# Patient Record
Sex: FEMALE | Race: WHITE | NOT HISPANIC OR LATINO | Employment: FULL TIME | URBAN - METROPOLITAN AREA
[De-identification: names, ages, dates, MRNs, and addresses within clinical notes are randomized per-mention and may not be internally consistent; named-entity substitution may affect disease eponyms.]

---

## 2023-01-10 ENCOUNTER — OFFICE VISIT (OUTPATIENT)
Dept: NEUROLOGY | Facility: CLINIC | Age: 53
End: 2023-01-10
Payer: OTHER GOVERNMENT

## 2023-01-10 VITALS
HEIGHT: 65 IN | BODY MASS INDEX: 31.65 KG/M2 | DIASTOLIC BLOOD PRESSURE: 84 MMHG | SYSTOLIC BLOOD PRESSURE: 122 MMHG | WEIGHT: 190 LBS

## 2023-01-10 DIAGNOSIS — G47.33 OSA (OBSTRUCTIVE SLEEP APNEA): Primary | ICD-10-CM

## 2023-01-10 DIAGNOSIS — I10 HYPERTENSION, UNSPECIFIED TYPE: ICD-10-CM

## 2023-01-10 DIAGNOSIS — R51.9 NONINTRACTABLE HEADACHE, UNSPECIFIED CHRONICITY PATTERN, UNSPECIFIED HEADACHE TYPE: ICD-10-CM

## 2023-01-10 DIAGNOSIS — R06.83 LOUD SNORING: ICD-10-CM

## 2023-01-10 DIAGNOSIS — G47.19 EXCESSIVE DAYTIME SLEEPINESS: ICD-10-CM

## 2023-01-10 DIAGNOSIS — R06.81 WITNESSED APNEIC SPELLS: ICD-10-CM

## 2023-01-10 PROCEDURE — 99204 OFFICE O/P NEW MOD 45 MIN: CPT | Mod: S$PBB,,, | Performed by: SPECIALIST

## 2023-01-10 PROCEDURE — 99204 PR OFFICE/OUTPT VISIT, NEW, LEVL IV, 45-59 MIN: ICD-10-PCS | Mod: S$PBB,,, | Performed by: SPECIALIST

## 2023-01-10 PROCEDURE — 99999 PR PBB SHADOW E&M-NEW PATIENT-LVL III: CPT | Mod: PBBFAC,,, | Performed by: SPECIALIST

## 2023-01-10 PROCEDURE — 99999 PR PBB SHADOW E&M-NEW PATIENT-LVL III: ICD-10-PCS | Mod: PBBFAC,,, | Performed by: SPECIALIST

## 2023-01-10 PROCEDURE — 99203 OFFICE O/P NEW LOW 30 MIN: CPT | Mod: PBBFAC | Performed by: SPECIALIST

## 2023-01-10 RX ORDER — LOSARTAN POTASSIUM 100 MG/1
100 TABLET ORAL
COMMUNITY
Start: 2022-10-30

## 2023-01-10 RX ORDER — FLUOCINOLONE ACETONIDE 0.11 MG/ML
5 OIL AURICULAR (OTIC)
COMMUNITY
Start: 2022-12-01

## 2023-01-10 RX ORDER — RIZATRIPTAN BENZOATE 10 MG/1
TABLET, ORALLY DISINTEGRATING ORAL
COMMUNITY
Start: 2022-12-26

## 2023-01-10 RX ORDER — DEXTROAMPHETAMINE SACCHARATE, AMPHETAMINE ASPARTATE, DEXTROAMPHETAMINE SULFATE AND AMPHETAMINE SULFATE 5; 5; 5; 5 MG/1; MG/1; MG/1; MG/1
1 TABLET ORAL 2 TIMES DAILY
COMMUNITY
Start: 2022-11-16

## 2023-01-10 RX ORDER — ERGOCALCIFEROL 1.25 MG/1
50000 CAPSULE ORAL
COMMUNITY
Start: 2022-12-07

## 2023-01-10 RX ORDER — FLUTICASONE PROPIONATE 50 MCG
2 SPRAY, SUSPENSION (ML) NASAL
COMMUNITY
Start: 2022-12-01

## 2023-01-10 RX ORDER — LAMOTRIGINE 200 MG/1
200 TABLET ORAL
COMMUNITY
Start: 2022-12-30

## 2023-01-10 RX ORDER — MOMETASONE FUROATE 1 MG/G
CREAM TOPICAL DAILY PRN
COMMUNITY
Start: 2022-12-01

## 2023-01-10 RX ORDER — IPRATROPIUM BROMIDE 42 UG/1
2 SPRAY, METERED NASAL 3 TIMES DAILY
COMMUNITY
Start: 2023-01-05

## 2023-01-10 RX ORDER — LEVOTHYROXINE SODIUM 200 UG/1
200 TABLET ORAL
COMMUNITY
Start: 2022-10-25

## 2023-01-10 RX ORDER — ESCITALOPRAM OXALATE 10 MG/1
TABLET ORAL
COMMUNITY
Start: 2022-12-30

## 2023-01-10 NOTE — PROGRESS NOTES
Subjective:       Patient ID: Shira Bobby is a 52 y.o. female.    Chief Complaint: snoring; sleep apnea evaluation; other____    HPI:            New pt for han eval   (Here for han eval//Pt reports no diff w sleep onset; c/o freq awakenings due to nocturia, sleeps 7 hrs nightly.   Denies naps.     C/o snoring, witnessed apnea, EDS, and feeling weak when laughing/angry/surprised.   No prior SS)    notes may also be on facesheet for HPI, ROS, and other sections     Review of Systems  Hard copy ROS reviewed     EPWORTH SLEEPINESS SCALE 1/10/2023   Sitting and reading 2   Watching TV 2   Sitting, inactive in a public place (e.g. a theatre or a meeting) 0   As a passenger in a car for an hour without a break 1   Lying down to rest in the afternoon when circumstances permit 2   Sitting and talking to someone 0   Sitting quietly after a lunch without alcohol 2   In a car, while stopped for a few minutes in traffic 0   Total score 9           Social History     Socioeconomic History    Marital status:    Tobacco Use    Smoking status: Never    Smokeless tobacco: Never   Substance and Sexual Activity    Alcohol use: Yes    Drug use: Never     ----------------------------  Headache  Hypertension      Current Outpatient Medications:     dextroamphetamine-amphetamine (ADDERALL) 20 mg tablet, Take 1 tablet by mouth 2 (two) times daily., Disp: , Rfl:     ergocalciferol (ERGOCALCIFEROL) 50,000 unit Cap, Take 50,000 Units by mouth every 7 days., Disp: , Rfl:     EScitalopram oxalate (LEXAPRO) 10 MG tablet, Take by mouth., Disp: , Rfl:     fluocinolone acetonide oiL 0.01 % Drop, Place 5 drops into both ears., Disp: , Rfl:     fluticasone propionate (FLONASE) 50 mcg/actuation nasal spray, 2 sprays by Each Nostril route., Disp: , Rfl:     ipratropium (ATROVENT) 42 mcg (0.06 %) nasal spray, 2 sprays by Each Nostril route 3 (three) times daily., Disp: , Rfl:     lamoTRIgine (LAMICTAL) 200 MG tablet, Take 200 mg by mouth.,  "Disp: , Rfl:     losartan (COZAAR) 100 MG tablet, Take 100 mg by mouth., Disp: , Rfl:     mometasone 0.1% (ELOCON) 0.1 % cream, Apply topically daily as needed., Disp: , Rfl:     rizatriptan (MAXALT-MLT) 10 MG disintegrating tablet, Take by mouth., Disp: , Rfl:     SYNTHROID 200 mcg tablet, Take 200 mcg by mouth., Disp: , Rfl:      Objective:      Exam:   /84 (BP Location: Left arm, Patient Position: Sitting)   Ht 5' 5" (1.651 m)   Wt 86.2 kg (190 lb)   BMI 31.62 kg/m²   Neck Circumference: 16 in   General Exam  if accompanied, by__  mental status_alert and appropriate  Oropharynx Mallampati grade_   body habitus_ Body mass index is 31.62 kg/m².   Heart_  extremities_  skin_  Neurological  speech__  cranial nerves:  CN 2 VF_  CN 7_no lower face asymmetry  CN 12 tongue_ok  motor__  Gait: unassisted    Labs:  __  Lengthy discussion about the risks of untreated moderate to severe obstructive sleep apnea (EVER).   Testing modalities/test options for sleep apnea discussed.  Potential need for treatment of EVER discussed including CPAP or Bilevel  PAP.   Alternatives to PAP discussed if PAP not ultimately tolerated.  Risks of drowsy driving discussed.  Weight loss discussed if patient is overweight.          Assessment/Plan:       Problem List Items Addressed This Visit    None              No orders of the defined types were placed in this encounter.       __ in lab, gold standard, technician attended, polysomnogram ordered, and if PAP needed pt willing and interested in coming into the lab for gold standard technician attended first night PAP titration       __ hst ordered, but if pap needed pt willing to come into the lab for gold standard technician attended first night PAP titration    __hst ordered, and if PAP needed pt unwilling to come into the lab for PAP night autopap can be ordered       Mook Cruz MD                "

## 2023-01-10 NOTE — PROGRESS NOTES
Neurology Note - Initial Encounter    Subjective:         Patient ID: Shira Bobby is a 52 y.o. female.    Chief Complaint: New pt for ever eval     HPI:            Pt reports no diff w sleep onset; c/o freq awakenings due to nocturia, sleeps 7 hrs nightly. Denies naps. C/o snoring, witnessed apnea, excessive daytime sleepiness    No prior SS     has recorded her snoring; wakes with headaches often;  wanted her to get checked for EVER; he himself utilizes PAP therapy    EPWORTH SLEEPINESS SCALE 1/10/2023   Sitting and reading 2   Watching TV 2   Sitting, inactive in a public place (e.g. a theatre or a meeting) 0   As a passenger in a car for an hour without a break 1   Lying down to rest in the afternoon when circumstances permit 2   Sitting and talking to someone 0   Sitting quietly after a lunch without alcohol 2   In a car, while stopped for a few minutes in traffic 0   Total score 9       ROS: as per HPI, otherwise pertinent systems review is negative          Past Medical History:   Diagnosis Date    Headache     Hypertension        Past Surgical History:   Procedure Laterality Date    CHOLECYSTECTOMY         Family History   Family history unknown: Yes       Social History     Socioeconomic History    Marital status:    Tobacco Use    Smoking status: Never    Smokeless tobacco: Never   Substance and Sexual Activity    Alcohol use: Yes    Drug use: Never       Review of patient's allergies indicates:  No Known Allergies      Current Outpatient Medications:     dextroamphetamine-amphetamine (ADDERALL) 20 mg tablet, Take 1 tablet by mouth 2 (two) times daily., Disp: , Rfl:     ergocalciferol (ERGOCALCIFEROL) 50,000 unit Cap, Take 50,000 Units by mouth every 7 days., Disp: , Rfl:     EScitalopram oxalate (LEXAPRO) 10 MG tablet, Take by mouth., Disp: , Rfl:     fluocinolone acetonide oiL 0.01 % Drop, Place 5 drops into both ears., Disp: , Rfl:     fluticasone propionate (FLONASE) 50  "mcg/actuation nasal spray, 2 sprays by Each Nostril route., Disp: , Rfl:     ipratropium (ATROVENT) 42 mcg (0.06 %) nasal spray, 2 sprays by Each Nostril route 3 (three) times daily., Disp: , Rfl:     lamoTRIgine (LAMICTAL) 200 MG tablet, Take 200 mg by mouth., Disp: , Rfl:     losartan (COZAAR) 100 MG tablet, Take 100 mg by mouth., Disp: , Rfl:     mometasone 0.1% (ELOCON) 0.1 % cream, Apply topically daily as needed., Disp: , Rfl:     rizatriptan (MAXALT-MLT) 10 MG disintegrating tablet, Take by mouth., Disp: , Rfl:     SYNTHROID 200 mcg tablet, Take 200 mcg by mouth., Disp: , Rfl:      Objective:      Exam:   /84 (BP Location: Left arm, Patient Position: Sitting)   Ht 5' 5" (1.651 m)   Wt 86.2 kg (190 lb)   BMI 31.62 kg/m²     Physical Exam  Vitals reviewed.   Constitutional:       Appearance: Normal appearance.      Accompanied by: alone  HENT:      Ears:      Comments: Hearing normal.     Mallampati: 3; tongue wide  3 mm overjet     Neck Circumference: 16"  Eyes:      Extraocular Movements: Extraocular movements intact.      VF's ok     Funduscopic exam: disc margins crisp  Cardiovascular:      Rate and Rhythm: Normal rate and regular rhythm.   Pulmonary:      Effort: Pulmonary effort is normal.      Breath sounds: Normal breath sounds.   Musculoskeletal:         General: Normal range of motion.       No lower ext edema  Skin:     General: Skin is warm and dry.   Neurological:      General: No focal deficit present.      Mental Status: she is alert and oriented to person, place, and time.      Speech: nml     Face: symmetric; tongue midline     Motor: nonlateralizing     Gait unassisted and normal.  Psychiatric:         Mood and Affect: Mood normal.         Behavior: Behavior normal.     Dr. Cruz physically present in exam room during patient encounter.         Assessment/Plan:     Problem List Items Addressed This Visit          Neuro    Nonintractable headache       Cardiac/Vascular    " Hypertension       Other    EVER (obstructive sleep apnea) - Primary    She agrees to come in for gold standard PSG and is willing to utilize PAP if needed; she is willing to come back for PAP titration if she has EVER. If she meets criteria proceed with split night     Lengthy discussion about the risks of untreated sleep apnea. Testing for sleep apnea discussed. Potential need for treatment of EVER discussed including CPAP or Bilevel PAP. Alternatives to PAP discussed if PAP not ultimately tolerated. Risks of drowsy driving discussed.      Excessive daytime sleepiness    Do not drive if too drowsy     Witnessed apneic spells         Luis Alberto Malcolm, MSN, APRN, AGACNP-BC